# Patient Record
Sex: MALE | Race: OTHER | NOT HISPANIC OR LATINO | URBAN - METROPOLITAN AREA
[De-identification: names, ages, dates, MRNs, and addresses within clinical notes are randomized per-mention and may not be internally consistent; named-entity substitution may affect disease eponyms.]

---

## 2024-01-25 ENCOUNTER — OFFICE VISIT (OUTPATIENT)
Dept: RHEUMATOLOGY | Facility: CLINIC | Age: 64
End: 2024-01-25
Payer: COMMERCIAL

## 2024-01-25 VITALS
SYSTOLIC BLOOD PRESSURE: 132 MMHG | BODY MASS INDEX: 33.49 KG/M2 | HEART RATE: 78 BPM | DIASTOLIC BLOOD PRESSURE: 82 MMHG | OXYGEN SATURATION: 94 % | WEIGHT: 240.1 LBS | TEMPERATURE: 96.8 F

## 2024-01-25 DIAGNOSIS — M19.042 PRIMARY OSTEOARTHRITIS OF BOTH HANDS: Primary | ICD-10-CM

## 2024-01-25 DIAGNOSIS — M19.041 PRIMARY OSTEOARTHRITIS OF BOTH HANDS: Primary | ICD-10-CM

## 2024-01-25 PROBLEM — J12.82 PNEUMONIA DUE TO COVID-19 VIRUS: Status: RESOLVED | Noted: 2021-01-11 | Resolved: 2024-01-25

## 2024-01-25 PROBLEM — K27.9 PUD (PEPTIC ULCER DISEASE): Status: ACTIVE | Noted: 2024-01-25

## 2024-01-25 PROBLEM — R76.8 RHEUMATOID FACTOR POSITIVE: Status: ACTIVE | Noted: 2024-01-25

## 2024-01-25 PROBLEM — E78.5 HYPERLIPIDEMIA: Status: ACTIVE | Noted: 2024-01-25

## 2024-01-25 PROBLEM — J18.9 COMMUNITY ACQUIRED PNEUMONIA: Status: RESOLVED | Noted: 2020-11-19 | Resolved: 2024-01-25

## 2024-01-25 PROBLEM — I10 HYPERTENSION: Status: ACTIVE | Noted: 2024-01-25

## 2024-01-25 PROBLEM — U07.1 PNEUMONIA DUE TO COVID-19 VIRUS: Status: RESOLVED | Noted: 2021-01-11 | Resolved: 2024-01-25

## 2024-01-25 PROCEDURE — 3075F SYST BP GE 130 - 139MM HG: CPT | Performed by: INTERNAL MEDICINE

## 2024-01-25 PROCEDURE — 99213 OFFICE O/P EST LOW 20 MIN: CPT | Performed by: INTERNAL MEDICINE

## 2024-01-25 PROCEDURE — 1036F TOBACCO NON-USER: CPT | Performed by: INTERNAL MEDICINE

## 2024-01-25 PROCEDURE — 3079F DIAST BP 80-89 MM HG: CPT | Performed by: INTERNAL MEDICINE

## 2024-01-25 RX ORDER — NABUMETONE 750 MG/1
750 TABLET, FILM COATED ORAL 2 TIMES DAILY PRN
Qty: 60 TABLET | Refills: 5 | Status: SHIPPED | OUTPATIENT
Start: 2024-01-25 | End: 2024-09-21

## 2024-01-25 RX ORDER — ATENOLOL 50 MG/1
50 TABLET ORAL DAILY
COMMUNITY
Start: 2024-01-02

## 2024-01-25 RX ORDER — CELECOXIB 200 MG/1
200 CAPSULE ORAL DAILY
COMMUNITY
Start: 2023-12-03 | End: 2024-01-25

## 2024-01-25 RX ORDER — SIMVASTATIN 10 MG/1
10 TABLET, FILM COATED ORAL NIGHTLY
COMMUNITY
Start: 2024-01-02

## 2024-01-25 RX ORDER — PANTOPRAZOLE SODIUM 40 MG/1
40 TABLET, DELAYED RELEASE ORAL DAILY
COMMUNITY
Start: 2023-12-03 | End: 2024-01-25 | Stop reason: WASHOUT

## 2024-01-25 ASSESSMENT — PATIENT HEALTH QUESTIONNAIRE - PHQ9
2. FEELING DOWN, DEPRESSED OR HOPELESS: NOT AT ALL
SUM OF ALL RESPONSES TO PHQ9 QUESTIONS 1 & 2: 0
1. LITTLE INTEREST OR PLEASURE IN DOING THINGS: NOT AT ALL

## 2024-01-25 ASSESSMENT — ENCOUNTER SYMPTOMS
NUMBNESS: 0
WEAKNESS: 0
JOINT SWELLING: 1
COUGH: 0
MYALGIAS: 0
COLOR CHANGE: 0
ARTHRALGIAS: 1
BACK PAIN: 0
STIFFNESS: 1
FATIGUE: 0
SHORTNESS OF BREATH: 0
FEVER: 0

## 2024-01-25 NOTE — PATIENT INSTRUCTIONS
It was a pleasure to see you today  Please call if your symptoms worsen  Please review your summary for education and reminders.  Follow up at your next appointment.    If you had labs/xrays done today, you will be able to view on [employer].   We will contact you when the results are reviewed for further discussion.  Please note that you may receive your results before I have had a chance to review.  Please know I will be contacting you for discussion  Homegoing instructions for all patient  A healthy lifestyle helps chronic diseases  These are all the goals you should strive to improve your overall health   Blood pressure <130/85   BMI of <30 or waist circumference that is 1/2 of your height   Fasting blood sugar <107 (if you are diabetic, aim for an A1c <6.4%_   LDL cholesterol <130   Avoid smoking   Manage your stress   Get your preventive exams   Get your immunizations

## 2024-01-25 NOTE — PROGRESS NOTES
Subjective   Patient ID: Ladarius Lyons is a 63 y.o. male who presents for Follow-up and Arthritis.  Arthritis  Presents for follow-up visit. He complains of pain, stiffness and joint swelling. The symptoms have been worsening. Pertinent negatives include no fatigue, fever or rash. (Pain still limited to hands.  Having more trouble with his  --he is a  so this is affecting him greatly.  Celebrex initially worked but now no longer has any effect) Compliance with total regimen is %. Compliance with medications is %.     Patient Active Problem List    Diagnosis Date Noted    Hypertension 01/25/2024    Hyperlipidemia 01/25/2024    Rheumatoid factor positive 01/25/2024    PUD (peptic ulcer disease) 01/25/2024    Osteoarthritis of hands, bilateral 01/25/2024     Past Medical History:   Diagnosis Date    Community acquired pneumonia 11/19/2020    Note: Last Assessment & Plan: Assessment: procal 0.23 (11/20).Completed 7 days of IV ceftriaxone.    Consumes 2 to 3 servings of caffeine per day     Pneumonia due to COVID-19 virus 01/11/2021    Post-COVID chronic neurologic symptoms    No Known Allergies  Current Outpatient Medications   Medication Instructions    atenolol (TENORMIN) 50 mg, oral, Daily    nabumetone (RELAFEN) 750 mg, oral, 2 times daily PRN    simvastatin (ZOCOR) 10 mg, oral, Nightly           Review of Systems   Constitutional:  Negative for fatigue and fever.   Respiratory:  Negative for cough and shortness of breath.    Cardiovascular:  Positive for chest pain. Negative for leg swelling.        Is scheduled to see cardiology next week to evaluate   Musculoskeletal:  Positive for arthralgias, arthritis, joint swelling and stiffness. Negative for back pain, gait problem and myalgias.   Skin:  Negative for color change and rash.   Neurological:  Negative for weakness and numbness.       Objective  /82 (BP Location: Left arm, Patient Position: Sitting, BP Cuff Size: Adult)   Pulse 78   Temp  36 °C (96.8 °F) (Temporal)   Wt 109 kg (240 lb 1.6 oz)   SpO2 94%   BMI 33.49 kg/m²     Physical Exam  Vitals reviewed.   Constitutional:       General: He is not in acute distress.     Appearance: Normal appearance.   HENT:      Head: Normocephalic and atraumatic.   Eyes:      Conjunctiva/sclera: Conjunctivae normal.   Pulmonary:      Effort: Pulmonary effort is normal. No respiratory distress.   Musculoskeletal:         General: Swelling present. No tenderness or deformity. Normal range of motion.      Cervical back: Normal range of motion.      Right lower leg: No edema.      Left lower leg: No edema.      Comments: No synovitis noted on exam  Swelling of PIP joints.  + swelling of CMC   No MCP involvement  No other joint involvement   Skin:     Findings: No bruising or rash.   Neurological:      General: No focal deficit present.      Mental Status: He is alert.      Gait: Gait normal.   Psychiatric:         Mood and Affect: Mood normal.         Assessment/Plan   Problem List Items Addressed This Visit       Osteoarthritis of hands, bilateral - Primary    Relevant Medications    nabumetone (Relafen) 750 mg tablet       Follow up 4 mo       Criss Parker MD 01/25/24 4:14 PM

## 2024-01-25 NOTE — LETTER
January 25, 2024     Gianluca Camejo MD  [address] [address]    Patient: Ladarius Lyons   YOB: 1960   Date of Visit: 1/25/2024       Dear Dr. Gianluca Camejo MD:    Thank you for referring Ladarius Lyons to me for evaluation. Below are my notes for this consultation.  If you have questions, please do not hesitate to call me. I look forward to following your patient along with you.       Sincerely,     Criss Parker MD      CC: No Recipients  ______________________________________________________________________________________    Subjective  Patient ID: Ladarius Lyons is a 63 y.o. male who presents for Follow-up and Arthritis.  Arthritis  Presents for follow-up visit. He complains of pain, stiffness and joint swelling. The symptoms have been worsening. Pertinent negatives include no fatigue, fever or rash. (Pain still limited to hands.  Having more trouble with his  --he is a  so this is affecting him greatly.  Celebrex initially worked but now no longer has any effect) Compliance with total regimen is %. Compliance with medications is %.     Patient Active Problem List    Diagnosis Date Noted   • Hypertension 01/25/2024   • Hyperlipidemia 01/25/2024   • Rheumatoid factor positive 01/25/2024   • PUD (peptic ulcer disease) 01/25/2024   • Osteoarthritis of hands, bilateral 01/25/2024     Past Medical History:   Diagnosis Date   • Community acquired pneumonia 11/19/2020    Note: Last Assessment & Plan: Assessment: procal 0.23 (11/20).Completed 7 days of IV ceftriaxone.   • Consumes 2 to 3 servings of caffeine per day    • Pneumonia due to COVID-19 virus 01/11/2021   • Post-COVID chronic neurologic symptoms    No Known Allergies  Current Outpatient Medications   Medication Instructions   • atenolol (TENORMIN) 50 mg, oral, Daily   • nabumetone (RELAFEN) 750 mg, oral, 2 times daily PRN   • simvastatin (ZOCOR) 10 mg, oral, Nightly           Review of Systems   Constitutional:   Negative for fatigue and fever.   Respiratory:  Negative for cough and shortness of breath.    Cardiovascular:  Positive for chest pain. Negative for leg swelling.        Is scheduled to see cardiology next week to evaluate   Musculoskeletal:  Positive for arthralgias, arthritis, joint swelling and stiffness. Negative for back pain, gait problem and myalgias.   Skin:  Negative for color change and rash.   Neurological:  Negative for weakness and numbness.       Objective /82 (BP Location: Left arm, Patient Position: Sitting, BP Cuff Size: Adult)   Pulse 78   Temp 36 °C (96.8 °F) (Temporal)   Wt 109 kg (240 lb 1.6 oz)   SpO2 94%   BMI 33.49 kg/m²     Physical Exam  Vitals reviewed.   Constitutional:       General: He is not in acute distress.     Appearance: Normal appearance.   HENT:      Head: Normocephalic and atraumatic.   Eyes:      Conjunctiva/sclera: Conjunctivae normal.   Pulmonary:      Effort: Pulmonary effort is normal. No respiratory distress.   Musculoskeletal:         General: Swelling present. No tenderness or deformity. Normal range of motion.      Cervical back: Normal range of motion.      Right lower leg: No edema.      Left lower leg: No edema.      Comments: No synovitis noted on exam  Swelling of PIP joints.  + swelling of CMC   No MCP involvement  No other joint involvement   Skin:     Findings: No bruising or rash.   Neurological:      General: No focal deficit present.      Mental Status: He is alert.      Gait: Gait normal.   Psychiatric:         Mood and Affect: Mood normal.         Assessment/Plan  Problem List Items Addressed This Visit       Osteoarthritis of hands, bilateral - Primary    Relevant Medications    nabumetone (Relafen) 750 mg tablet       Follow up 4 mo       Criss Parker MD 01/25/24 4:14 PM

## 2024-06-17 ENCOUNTER — APPOINTMENT (OUTPATIENT)
Dept: RHEUMATOLOGY | Facility: CLINIC | Age: 64
End: 2024-06-17
Payer: COMMERCIAL

## 2024-06-17 VITALS
HEIGHT: 71 IN | WEIGHT: 234.2 LBS | SYSTOLIC BLOOD PRESSURE: 124 MMHG | HEART RATE: 67 BPM | TEMPERATURE: 97.2 F | OXYGEN SATURATION: 95 % | DIASTOLIC BLOOD PRESSURE: 79 MMHG | BODY MASS INDEX: 32.79 KG/M2

## 2024-06-17 DIAGNOSIS — Z79.1 NSAID LONG-TERM USE: ICD-10-CM

## 2024-06-17 DIAGNOSIS — M19.042 PRIMARY OSTEOARTHRITIS OF BOTH HANDS: Primary | ICD-10-CM

## 2024-06-17 DIAGNOSIS — M19.041 PRIMARY OSTEOARTHRITIS OF BOTH HANDS: Primary | ICD-10-CM

## 2024-06-17 PROCEDURE — 1036F TOBACCO NON-USER: CPT | Performed by: INTERNAL MEDICINE

## 2024-06-17 PROCEDURE — 99214 OFFICE O/P EST MOD 30 MIN: CPT | Performed by: INTERNAL MEDICINE

## 2024-06-17 PROCEDURE — 3074F SYST BP LT 130 MM HG: CPT | Performed by: INTERNAL MEDICINE

## 2024-06-17 PROCEDURE — 3078F DIAST BP <80 MM HG: CPT | Performed by: INTERNAL MEDICINE

## 2024-06-17 RX ORDER — NABUMETONE 750 MG/1
750 TABLET, FILM COATED ORAL 2 TIMES DAILY PRN
Qty: 60 TABLET | Refills: 5 | Status: SHIPPED | OUTPATIENT
Start: 2024-06-17 | End: 2025-02-12

## 2024-06-17 RX ORDER — DICLOFENAC SODIUM 10 MG/G
2 GEL TOPICAL 4 TIMES DAILY PRN
Qty: 450 G | Refills: 3 | Status: SHIPPED | OUTPATIENT
Start: 2024-06-17 | End: 2025-06-17

## 2024-06-17 ASSESSMENT — ENCOUNTER SYMPTOMS
COLOR CHANGE: 0
FATIGUE: 0
ARTHRALGIAS: 1
COUGH: 0
WEAKNESS: 0
JOINT SWELLING: 0
NUMBNESS: 0
SHORTNESS OF BREATH: 0
FEVER: 0
BACK PAIN: 0
MYALGIAS: 1

## 2024-06-17 ASSESSMENT — PATIENT HEALTH QUESTIONNAIRE - PHQ9
SUM OF ALL RESPONSES TO PHQ9 QUESTIONS 1 AND 2: 0
2. FEELING DOWN, DEPRESSED OR HOPELESS: NOT AT ALL
1. LITTLE INTEREST OR PLEASURE IN DOING THINGS: NOT AT ALL

## 2024-06-17 NOTE — ASSESSMENT & PLAN NOTE
Pt shows clear improvement but wants further intervention.  Will add voltaren gel and see how he does with the combination.  Pt requested RA meds and advised patient and educated that he has OA not RA

## 2024-06-17 NOTE — PROGRESS NOTES
[facility] RHEUMATOLOGY     AND INTERNAL MEDICINE    RHEUMATOLOGY PROGRESS NOTE  Ladarius Lyons 64 y.o. male  Chief Complaint   Patient presents with    Arthritis       SUBJECTIVE  Pt reports he still has stiffness in the mornings.  Is ok during the day while at work.  Has some wrist and knee pain in addition to the hands.   Wants improved relief      Records since last seen reviewed in [facility], [facility] and Community Record  Patient Active Problem List    Diagnosis Date Noted    NSAID long-term use 06/17/2024    Hypertension 01/25/2024    Hyperlipidemia 01/25/2024    Rheumatoid factor positive 01/25/2024    PUD (peptic ulcer disease) 01/25/2024    Osteoarthritis of hands, bilateral 01/25/2024     Past Medical History:   Diagnosis Date    Community acquired pneumonia 11/19/2020    Note: Last Assessment & Plan: Assessment: procal 0.23 (11/20).Completed 7 days of IV ceftriaxone.    Consumes 2 to 3 servings of caffeine per day     Pneumonia due to COVID-19 virus 01/11/2021    Post-COVID chronic neurologic symptoms      Current Outpatient Medications   Medication Instructions    atenolol (TENORMIN) 50 mg, oral, Daily    diclofenac sodium (VOLTAREN) 2 g, Topical, 4 times daily PRN    nabumetone (RELAFEN) 750 mg, oral, 2 times daily PRN    simvastatin (ZOCOR) 10 mg, oral, Nightly     No Known Allergies  Review of Systems   Constitutional:  Negative for fatigue and fever.   Respiratory:  Negative for cough and shortness of breath.    Cardiovascular:  Negative for leg swelling.   Musculoskeletal:  Positive for arthralgias and myalgias. Negative for back pain, gait problem and joint swelling.   Skin:  Negative for color change and rash.   Neurological:  Negative for weakness and numbness.     Social History     Tobacco Use    Smoking status: Former     Types: Cigarettes    Smokeless tobacco: Never   Substance Use Topics    Alcohol use: Yes     Comment: social    Drug use:  "Never     PHYSICAL EXAM  /79   Pulse 67   Temp 36.2 °C (97.2 °F)   Ht 1.803 m (5' 11\")   Wt 106 kg (234 lb 3.2 oz)   SpO2 95%   BMI 32.66 kg/m²   Physical Exam  Vitals reviewed.   Constitutional:       General: He is not in acute distress.     Appearance: Normal appearance.   HENT:      Head: Normocephalic and atraumatic.   Eyes:      General: No scleral icterus.     Extraocular Movements: Extraocular movements intact.      Conjunctiva/sclera: Conjunctivae normal.      Pupils: Pupils are equal, round, and reactive to light.   Pulmonary:      Effort: Pulmonary effort is normal. No respiratory distress.   Musculoskeletal:         General: No swelling, tenderness or deformity. Normal range of motion.      Cervical back: Normal range of motion and neck supple. No tenderness.      Right lower leg: No edema.      Left lower leg: No edema.      Comments: No synovitis noted on exam  Mild OA changes in hands   Skin:     Findings: No bruising or rash.   Neurological:      General: No focal deficit present.      Mental Status: He is alert and oriented to person, place, and time. Mental status is at baseline.      Gait: Gait normal.   Psychiatric:         Mood and Affect: Mood normal.       Health Maintenance Due   Topic Date Due    Yearly Adult Physical  Never done    Lipid Panel  Never done    HIV Screening  Never done    Colorectal Cancer Screening  Never done    DTaP/Tdap/Td Vaccines (1 - Tdap) Never done    Zoster Vaccines (1 of 2) Never done    RSV Pregnant patients and/or  patients aged 60+ years (1 - 1-dose 60+ series) Never done    COVID-19 Vaccine (4 - 2023-24 season) 09/01/2023     Assessment/plan  Problem List Items Addressed This Visit       Osteoarthritis of hands, bilateral - Primary    Current Assessment & Plan     Pt shows clear improvement but wants further intervention.  Will add voltaren gel and see how he does with the combination.  Pt requested RA meds and advised patient and educated that he " has OA not RA         Relevant Medications    diclofenac sodium (Voltaren) 1 % gel    nabumetone (Relafen) 750 mg tablet    NSAID long-term use     Follow up: ___6__months

## 2024-06-17 NOTE — LETTER
June 17, 2024     Gianluca Camejo MD  [address] [address]    Patient: Ladarius Lyons   YOB: 1960   Date of Visit: 6/17/2024       Dear Dr. Gianluca Camejo MD:    Thank you for referring Ladarius Lyons to me for evaluation. Below are my notes for this visit  If you have questions, please do not hesitate to call me. I look forward to following your patient along with you.       Sincerely,     Criss Parker MD      CC: No Recipients  ______________________________________________________________________________________                                                    [facility] RHEUMATOLOGY     AND INTERNAL MEDICINE    RHEUMATOLOGY PROGRESS NOTE  Ladarius Lyons 64 y.o. male  Chief Complaint   Patient presents with   • Arthritis       SUBJECTIVE  Pt reports he still has stiffness in the mornings.  Is ok during the day while at work.  Has some wrist and knee pain in addition to the hands.   Wants improved relief      Records since last seen reviewed in [facility], [facility] and [facility] Record  Patient Active Problem List    Diagnosis Date Noted   • NSAID long-term use 06/17/2024   • Hypertension 01/25/2024   • Hyperlipidemia 01/25/2024   • Rheumatoid factor positive 01/25/2024   • PUD (peptic ulcer disease) 01/25/2024   • Osteoarthritis of hands, bilateral 01/25/2024     Past Medical History:   Diagnosis Date   • Community acquired pneumonia 11/19/2020    Note: Last Assessment & Plan: Assessment: procal 0.23 (11/20).Completed 7 days of IV ceftriaxone.   • Consumes 2 to 3 servings of caffeine per day    • Pneumonia due to COVID-19 virus 01/11/2021   • Post-COVID chronic neurologic symptoms      Current Outpatient Medications   Medication Instructions   • atenolol (TENORMIN) 50 mg, oral, Daily   • diclofenac sodium (VOLTAREN) 2 g, Topical, 4 times daily PRN   • nabumetone (RELAFEN) 750 mg, oral, 2 times daily PRN   • simvastatin (ZOCOR) 10 mg, oral, Nightly     No Known  "Allergies  Review of Systems   Constitutional:  Negative for fatigue and fever.   Respiratory:  Negative for cough and shortness of breath.    Cardiovascular:  Negative for leg swelling.   Musculoskeletal:  Positive for arthralgias and myalgias. Negative for back pain, gait problem and joint swelling.   Skin:  Negative for color change and rash.   Neurological:  Negative for weakness and numbness.     Social History     Tobacco Use   • Smoking status: Former     Types: Cigarettes   • Smokeless tobacco: Never   Substance Use Topics   • Alcohol use: Yes     Comment: social   • Drug use: Never     PHYSICAL EXAM  /79   Pulse 67   Temp 36.2 °C (97.2 °F)   Ht 1.803 m (5' 11\")   Wt 106 kg (234 lb 3.2 oz)   SpO2 95%   BMI 32.66 kg/m²   Physical Exam  Vitals reviewed.   Constitutional:       General: He is not in acute distress.     Appearance: Normal appearance.   HENT:      Head: Normocephalic and atraumatic.   Eyes:      General: No scleral icterus.     Extraocular Movements: Extraocular movements intact.      Conjunctiva/sclera: Conjunctivae normal.      Pupils: Pupils are equal, round, and reactive to light.   Pulmonary:      Effort: Pulmonary effort is normal. No respiratory distress.   Musculoskeletal:         General: No swelling, tenderness or deformity. Normal range of motion.      Cervical back: Normal range of motion and neck supple. No tenderness.      Right lower leg: No edema.      Left lower leg: No edema.      Comments: No synovitis noted on exam  Mild OA changes in hands   Skin:     Findings: No bruising or rash.   Neurological:      General: No focal deficit present.      Mental Status: He is alert and oriented to person, place, and time. Mental status is at baseline.      Gait: Gait normal.   Psychiatric:         Mood and Affect: Mood normal.       Health Maintenance Due   Topic Date Due   • Yearly Adult Physical  Never done   • Lipid Panel  Never done   • HIV Screening  Never done   • " Colorectal Cancer Screening  Never done   • DTaP/Tdap/Td Vaccines (1 - Tdap) Never done   • Zoster Vaccines (1 of 2) Never done   • RSV Pregnant patients and/or  patients aged 60+ years (1 - 1-dose 60+ series) Never done   • COVID-19 Vaccine (4 - 2023-24 season) 09/01/2023     Assessment/plan  Problem List Items Addressed This Visit       Osteoarthritis of hands, bilateral - Primary    Current Assessment & Plan     Pt shows clear improvement but wants further intervention.  Will add voltaren gel and see how he does with the combination.  Pt requested RA meds and advised patient and educated that he has OA not RA         Relevant Medications    diclofenac sodium (Voltaren) 1 % gel    nabumetone (Relafen) 750 mg tablet    NSAID long-term use     Follow up: ___6__months

## 2024-06-17 NOTE — PATIENT INSTRUCTIONS
It was a pleasure to see you today  Please call if your symptoms worsen  Please review your summary for education and reminders.  Follow up at your next appointment.    If you had labs/xrays done today, you will be able to view on [employer].   We will contact you when the results are reviewed for further discussion.  Please note that you may receive your results before I have had a chance to review.  Please know I will be contacting you for discussion  Homegoing instructions for all patient  A healthy lifestyle helps chronic diseases  These are all the goals you should strive to improve your overall health   Blood pressure <130/85   BMI of <30 or waist circumference that is 1/2 of your height   Fasting blood sugar <107 (if you are diabetic, aim for an A1c <6.4%_   LDL cholesterol <130   Avoid smoking   Manage your stress   Get your preventive exams   Get your immunizations   You are on an anti-inflammatory medication.  Always make sure you take this medication with food.   You increase your risk of an ulcer if not taken correctly.  Recent studies have shown there is an increased risk of heart attacks and stroke with chronic use.  Discontinue and see your doctor if you have any suspicious symptoms.    If possible, only take this medication on an as needed basis

## 2024-12-16 ENCOUNTER — APPOINTMENT (OUTPATIENT)
Dept: RHEUMATOLOGY | Facility: CLINIC | Age: 64
End: 2024-12-16
Payer: COMMERCIAL

## 2024-12-16 VITALS
DIASTOLIC BLOOD PRESSURE: 81 MMHG | HEIGHT: 71 IN | BODY MASS INDEX: 33.08 KG/M2 | TEMPERATURE: 97.2 F | SYSTOLIC BLOOD PRESSURE: 130 MMHG | WEIGHT: 236.3 LBS | HEART RATE: 83 BPM | OXYGEN SATURATION: 95 %

## 2024-12-16 DIAGNOSIS — M19.042 PRIMARY OSTEOARTHRITIS OF BOTH HANDS: Primary | ICD-10-CM

## 2024-12-16 DIAGNOSIS — M19.041 PRIMARY OSTEOARTHRITIS OF BOTH HANDS: Primary | ICD-10-CM

## 2024-12-16 DIAGNOSIS — Z79.1 NSAID LONG-TERM USE: ICD-10-CM

## 2024-12-16 PROCEDURE — 3008F BODY MASS INDEX DOCD: CPT | Performed by: INTERNAL MEDICINE

## 2024-12-16 PROCEDURE — 1036F TOBACCO NON-USER: CPT | Performed by: INTERNAL MEDICINE

## 2024-12-16 PROCEDURE — 3079F DIAST BP 80-89 MM HG: CPT | Performed by: INTERNAL MEDICINE

## 2024-12-16 PROCEDURE — 99214 OFFICE O/P EST MOD 30 MIN: CPT | Performed by: INTERNAL MEDICINE

## 2024-12-16 PROCEDURE — 3075F SYST BP GE 130 - 139MM HG: CPT | Performed by: INTERNAL MEDICINE

## 2024-12-16 RX ORDER — DICLOFENAC SODIUM 10 MG/G
2 GEL TOPICAL 4 TIMES DAILY PRN
Qty: 450 G | Refills: 3 | Status: SHIPPED | OUTPATIENT
Start: 2024-12-16 | End: 2025-12-16

## 2024-12-16 ASSESSMENT — PATIENT HEALTH QUESTIONNAIRE - PHQ9
2. FEELING DOWN, DEPRESSED OR HOPELESS: NOT AT ALL
1. LITTLE INTEREST OR PLEASURE IN DOING THINGS: NOT AT ALL
SUM OF ALL RESPONSES TO PHQ9 QUESTIONS 1 AND 2: 0

## 2024-12-16 ASSESSMENT — ENCOUNTER SYMPTOMS
BACK PAIN: 0
SHORTNESS OF BREATH: 0
FEVER: 0
JOINT SWELLING: 1
ARTHRALGIAS: 1
WEAKNESS: 0
COLOR CHANGE: 0
FATIGUE: 0
COUGH: 0
MYALGIAS: 0
NUMBNESS: 0

## 2024-12-16 NOTE — PROGRESS NOTES
[facility] RHEUMATOLOGY     AND INTERNAL MEDICINE    RHEUMATOLOGY PROGRESS NOTE    Ladarius Lyons 64 y.o. male  :  1960  PCP:  Gianluca Camejo MD    Chief Complaint   Patient presents with    Osteoarthritis       SUBJECTIVE  Combination of relafen and voltaren gel is working well   Having days with minimal pain but does have flares with pain and weakness in hands, shoulders and knees.  Doing more and this regimen is working better than previous      Records since last seen reviewed in [facility], [facility] and Community Record  Patient Active Problem List    Diagnosis Date Noted    NSAID long-term use 2024    Hypertension 2024    Hyperlipidemia 2024    Rheumatoid factor positive 2024    PUD (peptic ulcer disease) 2024    Osteoarthritis of hands, bilateral 2024     Past Medical History:   Diagnosis Date    Community acquired pneumonia 2020    Note: Last Assessment & Plan: Assessment: procal 0.23 ().Completed 7 days of IV ceftriaxone.    Consumes 2 to 3 servings of caffeine per day     Pneumonia due to COVID-19 virus 2021    Post-COVID chronic neurologic symptoms      Current Outpatient Medications on File Prior to Visit   Medication Sig Dispense Refill    atenolol (Tenormin) 50 mg tablet Take 1 tablet (50 mg) by mouth once daily.      diclofenac sodium (Voltaren) 1 % gel Apply 2.25 inches (2 g) topically 4 times a day as needed (pain). 450 g 3    nabumetone (Relafen) 750 mg tablet Take 1 tablet (750 mg) by mouth 2 times a day as needed for moderate pain (4 - 6). 60 tablet 5    simvastatin (Zocor) 10 mg tablet Take 1 tablet (10 mg) by mouth once daily at bedtime.       No current facility-administered medications on file prior to visit.     No Known Allergies  Social History     Tobacco Use    Smoking status: Former     Types: Cigarettes    Smokeless tobacco: Never   Substance Use Topics    Alcohol use: Yes      "Comment: social    Drug use: Never     Review of Systems   Constitutional:  Negative for fatigue and fever.   Respiratory:  Negative for cough and shortness of breath.    Cardiovascular:  Negative for leg swelling.   Musculoskeletal:  Positive for arthralgias and joint swelling. Negative for back pain, gait problem and myalgias.   Skin:  Negative for color change and rash.   Neurological:  Negative for weakness and numbness.       PHYSICAL EXAM  /81   Pulse 83   Temp 36.2 °C (97.2 °F)   Ht 1.803 m (5' 11\")   Wt 107 kg (236 lb 4.8 oz)   SpO2 95%   BMI 32.96 kg/m²   Depression: Not at risk (12/16/2024)    PHQ-2     PHQ-2 Score: 0     Physical Exam  Vitals reviewed.   Constitutional:       General: He is not in acute distress.     Appearance: Normal appearance.   HENT:      Head: Normocephalic and atraumatic.   Eyes:      General: No scleral icterus.     Extraocular Movements: Extraocular movements intact.      Conjunctiva/sclera: Conjunctivae normal.      Pupils: Pupils are equal, round, and reactive to light.   Pulmonary:      Effort: Pulmonary effort is normal. No respiratory distress.   Musculoskeletal:         General: No swelling, tenderness or deformity. Normal range of motion.      Cervical back: Normal range of motion and neck supple. No tenderness.      Right lower leg: No edema.      Left lower leg: No edema.      Comments: No synovitis noted on exam  Mild OA changes in hands   Skin:     Findings: No bruising or rash.   Neurological:      General: No focal deficit present.      Mental Status: He is alert and oriented to person, place, and time. Mental status is at baseline.      Gait: Gait normal.   Psychiatric:         Mood and Affect: Mood normal.           Health Maintenance Due   Topic Date Due    Yearly Adult Physical  Never done    Lipid Panel  Never done    HIV Screening  Never done    Colorectal Cancer Screening  Never done    DTaP/Tdap/Td Vaccines (1 - Tdap) Never done    Zoster Vaccines " (1 of 2) Never done    Influenza Vaccine (1) 09/01/2024    COVID-19 Vaccine (4 - 2024-25 season) 09/01/2024       Assessment/plan  Assessment & Plan  Primary osteoarthritis of both hands  Doing better with NSAID and use of NSAID-based topical.  Pt to continue medications and call me if symptoms worsen  Orders:    diclofenac sodium (Voltaren) 1 % gel; Apply 2.25 inches (2 g) topically 4 times a day as needed (pain).    NSAID long-term use  Monitoring labs done by PCP and deferred at this time         Follow up: ___6__months    Immunization History   Administered Date(s) Administered    Flu vaccine (IIV4), preservative free *Check age/dose* 01/25/2024    Flu vaccine, quadrivalent, recombinant, preservative free, adult (FLUBLOK) 11/30/2022    Pfizer Gray Cap SARS-CoV-2 02/14/2022    Pfizer Purple Cap SARS-CoV-2 06/29/2021, 07/25/2021

## 2024-12-16 NOTE — LETTER
2024     Gianluca Camejo MD  [address] [address]    Patient: Ladarius Lyons   YOB: 1960   Date of Visit: 2024       Dear Dr. Gianluca Camejo MD:    Thank you for referring Ladarius Lyons to me for evaluation. Below are my notes for this visit.  If you have questions, please do not hesitate to call me. I look forward to following your patient along with you.       Sincerely,     Criss Parker MD      CC: No Recipients  ______________________________________________________________________________________                                                    [facility] RHEUMATOLOGY     AND INTERNAL MEDICINE    RHEUMATOLOGY PROGRESS NOTE    Ladarius Lyons 64 y.o. male  :  1960  PCP:  Gianluca Camejo MD    Chief Complaint   Patient presents with   • Osteoarthritis       SUBJECTIVE  Combination of relafen and voltaren gel is working well   Having days with minimal pain but does have flares with pain and weakness in hands, shoulders and knees.  Doing more and this regimen is working better than previous      Records since last seen reviewed in [facility]  Patient Active Problem List    Diagnosis Date Noted   • NSAID long-term use 2024   • Hypertension 2024   • Hyperlipidemia 2024   • Rheumatoid factor positive 2024   • PUD (peptic ulcer disease) 2024   • Osteoarthritis of hands, bilateral 2024     Past Medical History:   Diagnosis Date   • Community acquired pneumonia 2020    Note: Last Assessment & Plan: Assessment: procal 0.23 ().Completed 7 days of IV ceftriaxone.   • Consumes 2 to 3 servings of caffeine per day    • Pneumonia due to COVID-19 virus 2021   • Post-COVID chronic neurologic symptoms      Current Outpatient Medications on File Prior to Visit   Medication Sig Dispense Refill   • atenolol (Tenormin) 50 mg tablet Take 1 tablet (50 mg) by mouth once daily.     • diclofenac  "sodium (Voltaren) 1 % gel Apply 2.25 inches (2 g) topically 4 times a day as needed (pain). 450 g 3   • nabumetone (Relafen) 750 mg tablet Take 1 tablet (750 mg) by mouth 2 times a day as needed for moderate pain (4 - 6). 60 tablet 5   • simvastatin (Zocor) 10 mg tablet Take 1 tablet (10 mg) by mouth once daily at bedtime.       No current facility-administered medications on file prior to visit.     No Known Allergies  Social History     Tobacco Use   • Smoking status: Former     Types: Cigarettes   • Smokeless tobacco: Never   Substance Use Topics   • Alcohol use: Yes     Comment: social   • Drug use: Never     Review of Systems   Constitutional:  Negative for fatigue and fever.   Respiratory:  Negative for cough and shortness of breath.    Cardiovascular:  Negative for leg swelling.   Musculoskeletal:  Positive for arthralgias and joint swelling. Negative for back pain, gait problem and myalgias.   Skin:  Negative for color change and rash.   Neurological:  Negative for weakness and numbness.       PHYSICAL EXAM  /81   Pulse 83   Temp 36.2 °C (97.2 °F)   Ht 1.803 m (5' 11\")   Wt 107 kg (236 lb 4.8 oz)   SpO2 95%   BMI 32.96 kg/m²   Depression: Not at risk (12/16/2024)    PHQ-2    • PHQ-2 Score: 0     Physical Exam  Vitals reviewed.   Constitutional:       General: He is not in acute distress.     Appearance: Normal appearance.   HENT:      Head: Normocephalic and atraumatic.   Eyes:      General: No scleral icterus.     Extraocular Movements: Extraocular movements intact.      Conjunctiva/sclera: Conjunctivae normal.      Pupils: Pupils are equal, round, and reactive to light.   Pulmonary:      Effort: Pulmonary effort is normal. No respiratory distress.   Musculoskeletal:         General: No swelling, tenderness or deformity. Normal range of motion.      Cervical back: Normal range of motion and neck supple. No tenderness.      Right lower leg: No edema.      Left lower leg: No edema.      Comments: " No synovitis noted on exam  Mild OA changes in hands   Skin:     Findings: No bruising or rash.   Neurological:      General: No focal deficit present.      Mental Status: He is alert and oriented to person, place, and time. Mental status is at baseline.      Gait: Gait normal.   Psychiatric:         Mood and Affect: Mood normal.           Health Maintenance Due   Topic Date Due   • Yearly Adult Physical  Never done   • Lipid Panel  Never done   • HIV Screening  Never done   • Colorectal Cancer Screening  Never done   • DTaP/Tdap/Td Vaccines (1 - Tdap) Never done   • Zoster Vaccines (1 of 2) Never done   • Influenza Vaccine (1) 09/01/2024   • COVID-19 Vaccine (4 - 2024-25 season) 09/01/2024       Assessment/plan  Assessment & Plan  Primary osteoarthritis of both hands  Doing better with NSAID and use of NSAID-based topical.  Pt to continue medications and call me if symptoms worsen  Orders:  •  diclofenac sodium (Voltaren) 1 % gel; Apply 2.25 inches (2 g) topically 4 times a day as needed (pain).    NSAID long-term use  Monitoring labs done by PCP and deferred at this time         Follow up: ___6__months    Immunization History   Administered Date(s) Administered   • Flu vaccine (IIV4), preservative free *Check age/dose* 01/25/2024   • Flu vaccine, quadrivalent, recombinant, preservative free, adult (FLUBLOK) 11/30/2022   • Pfizer Gray Cap SARS-CoV-2 02/14/2022   • Pfizer Purple Cap SARS-CoV-2 06/29/2021, 07/25/2021

## 2024-12-16 NOTE — ASSESSMENT & PLAN NOTE
Doing better with NSAID and use of NSAID-based topical.  Pt to continue medications and call me if symptoms worsen  Orders:    diclofenac sodium (Voltaren) 1 % gel; Apply 2.25 inches (2 g) topically 4 times a day as needed (pain).

## 2024-12-16 NOTE — PATIENT INSTRUCTIONS
It was a pleasure to see you today  Please call if your symptoms worsen  Please review your summary for education and reminders.  Follow up at your next appointment.    If you had labs/xrays done today, you will be able to view on [employer].   We will contact you when the results are reviewed for further discussion.  Please note that you may receive your results before I have had a chance to review.  Please know I will be contacting you for discussion  Homegoing instructions for all patient  A healthy lifestyle helps chronic diseases  These are all the goals you should strive to improve your overall health   Blood pressure <130/85   BMI of <30 or waist circumference that is 1/2 of your height   Fasting blood sugar <107 (if you are diabetic, aim for an A1c <6.4%_   LDL cholesterol <130   Avoid smoking   Manage your stress   Get your preventive exams   Get your immunizations

## 2025-06-16 ENCOUNTER — APPOINTMENT (OUTPATIENT)
Dept: RHEUMATOLOGY | Facility: CLINIC | Age: 65
End: 2025-06-16
Payer: COMMERCIAL